# Patient Record
Sex: FEMALE | Race: ASIAN | NOT HISPANIC OR LATINO | Employment: OTHER | URBAN - METROPOLITAN AREA
[De-identification: names, ages, dates, MRNs, and addresses within clinical notes are randomized per-mention and may not be internally consistent; named-entity substitution may affect disease eponyms.]

---

## 2021-05-29 ENCOUNTER — RECORDS - HEALTHEAST (OUTPATIENT)
Dept: ADMINISTRATIVE | Facility: CLINIC | Age: 51
End: 2021-05-29

## 2021-07-21 ENCOUNTER — RECORDS - HEALTHEAST (OUTPATIENT)
Dept: ADMINISTRATIVE | Facility: CLINIC | Age: 51
End: 2021-07-21

## 2023-02-07 ENCOUNTER — ANCILLARY PROCEDURE (OUTPATIENT)
Dept: GENERAL RADIOLOGY | Facility: CLINIC | Age: 53
End: 2023-02-07
Attending: NURSE PRACTITIONER
Payer: COMMERCIAL

## 2023-02-07 ENCOUNTER — LAB (OUTPATIENT)
Dept: FAMILY MEDICINE | Facility: CLINIC | Age: 53
End: 2023-02-07

## 2023-02-07 ENCOUNTER — OFFICE VISIT (OUTPATIENT)
Dept: FAMILY MEDICINE | Facility: CLINIC | Age: 53
End: 2023-02-07
Payer: COMMERCIAL

## 2023-02-07 VITALS
BODY MASS INDEX: 23.84 KG/M2 | WEIGHT: 121.4 LBS | DIASTOLIC BLOOD PRESSURE: 82 MMHG | HEART RATE: 76 BPM | HEIGHT: 60 IN | SYSTOLIC BLOOD PRESSURE: 118 MMHG

## 2023-02-07 DIAGNOSIS — M54.41 ACUTE MIDLINE LOW BACK PAIN WITH RIGHT-SIDED SCIATICA: ICD-10-CM

## 2023-02-07 DIAGNOSIS — Z12.11 SCREEN FOR COLON CANCER: ICD-10-CM

## 2023-02-07 DIAGNOSIS — Z12.31 VISIT FOR SCREENING MAMMOGRAM: ICD-10-CM

## 2023-02-07 DIAGNOSIS — N95.1 PERIMENOPAUSAL: ICD-10-CM

## 2023-02-07 DIAGNOSIS — Z13.1 SCREENING FOR DIABETES MELLITUS: ICD-10-CM

## 2023-02-07 DIAGNOSIS — Z12.4 SCREENING FOR MALIGNANT NEOPLASM OF CERVIX: ICD-10-CM

## 2023-02-07 DIAGNOSIS — Z00.00 ROUTINE GENERAL MEDICAL EXAMINATION AT A HEALTH CARE FACILITY: Primary | ICD-10-CM

## 2023-02-07 DIAGNOSIS — Z13.220 SCREENING FOR LIPID DISORDERS: ICD-10-CM

## 2023-02-07 LAB
ALBUMIN SERPL BCG-MCNC: 4.3 G/DL (ref 3.5–5.2)
ALP SERPL-CCNC: 57 U/L (ref 35–104)
ALT SERPL W P-5'-P-CCNC: 15 U/L (ref 10–35)
ANION GAP SERPL CALCULATED.3IONS-SCNC: 10 MMOL/L (ref 7–15)
AST SERPL W P-5'-P-CCNC: 21 U/L (ref 10–35)
BILIRUB SERPL-MCNC: 0.8 MG/DL
BUN SERPL-MCNC: 13.3 MG/DL (ref 6–20)
CALCIUM SERPL-MCNC: 9.2 MG/DL (ref 8.6–10)
CHLORIDE SERPL-SCNC: 103 MMOL/L (ref 98–107)
CHOLEST SERPL-MCNC: 278 MG/DL
CREAT SERPL-MCNC: 0.64 MG/DL (ref 0.51–0.95)
DEPRECATED HCO3 PLAS-SCNC: 28 MMOL/L (ref 22–29)
GFR SERPL CREATININE-BSD FRML MDRD: >90 ML/MIN/1.73M2
GLUCOSE SERPL-MCNC: 91 MG/DL (ref 70–99)
HDLC SERPL-MCNC: 59 MG/DL
LDLC SERPL CALC-MCNC: 177 MG/DL
NONHDLC SERPL-MCNC: 219 MG/DL
POTASSIUM SERPL-SCNC: 3.9 MMOL/L (ref 3.4–5.3)
PROT SERPL-MCNC: 7 G/DL (ref 6.4–8.3)
SODIUM SERPL-SCNC: 141 MMOL/L (ref 136–145)
TRIGL SERPL-MCNC: 211 MG/DL

## 2023-02-07 PROCEDURE — 99203 OFFICE O/P NEW LOW 30 MIN: CPT | Mod: 25 | Performed by: NURSE PRACTITIONER

## 2023-02-07 PROCEDURE — 99386 PREV VISIT NEW AGE 40-64: CPT | Mod: 25 | Performed by: NURSE PRACTITIONER

## 2023-02-07 PROCEDURE — 72100 X-RAY EXAM L-S SPINE 2/3 VWS: CPT | Mod: TC | Performed by: RADIOLOGY

## 2023-02-07 PROCEDURE — 80061 LIPID PANEL: CPT | Performed by: NURSE PRACTITIONER

## 2023-02-07 PROCEDURE — 36415 COLL VENOUS BLD VENIPUNCTURE: CPT | Performed by: NURSE PRACTITIONER

## 2023-02-07 PROCEDURE — 90715 TDAP VACCINE 7 YRS/> IM: CPT | Performed by: NURSE PRACTITIONER

## 2023-02-07 PROCEDURE — 90471 IMMUNIZATION ADMIN: CPT | Performed by: NURSE PRACTITIONER

## 2023-02-07 PROCEDURE — 80053 COMPREHEN METABOLIC PANEL: CPT | Performed by: NURSE PRACTITIONER

## 2023-02-07 PROCEDURE — 87624 HPV HI-RISK TYP POOLED RSLT: CPT | Performed by: NURSE PRACTITIONER

## 2023-02-07 PROCEDURE — G0145 SCR C/V CYTO,THINLAYER,RESCR: HCPCS | Performed by: NURSE PRACTITIONER

## 2023-02-07 NOTE — PROGRESS NOTES
SUBJECTIVE:   CC: Baudilio is an 52 year old who presents for preventive health visit.     Patient has 4 children.  She is self-employed as a realtor.  It has been a number of years since she has had any medical care.    Menstrual cycles are irregular.  She has been skipping periods since last summer and has not had one since November.  She was having some night sweats at one point, but this has resolved.  She is overdue for Pap; no history of abnormal that she recalls.    Patient reports she did some stool testing for [facility] a couple of months ago and believes it was negative.    Patient complains of midline low back pain.  Symptoms started about 3 months ago with no specific injury.  The pain is intermittent with some intermittent numbness to her right leg.  Pain is worse with sitting or bending forward.  No over-the-counter treatments tried.  She has tried some stretching.    Declines any vaccines today.    Patient has been advised of split billing requirements and indicates understanding: Yes  History of Present Illness       Reason for visit:  Pap test    She eats 2-3 servings of fruits and vegetables daily.She consumes 0 sweetened beverage(s) daily.She exercises with enough effort to increase her heart rate 9 or less minutes per day.  She exercises with enough effort to increase her heart rate 3 or less days per week.   She is taking medications regularly.        Today's PHQ-2 Score:   PHQ-2 ( 1999 [employer]) 2/7/2023   Q1: Little interest or pleasure in doing things 2   Q2: Feeling down, depressed or hopeless 0   PHQ-2 Score 2   Q1: Little interest or pleasure in doing things More than half the days   Q2: Feeling down, depressed or hopeless Not at all   PHQ-2 Score 2       Have you ever done Advance Care Planning? (For example, a Health Directive, POLST, or a discussion with a medical provider or your loved ones about your wishes): No, advance care planning information given to patient to review.  Patient plans  to discuss their wishes with loved ones or provider.      Social History     Tobacco Use     Smoking status: Never     Passive exposure: Never     Smokeless tobacco: Never   Substance Use Topics     Alcohol use: Not Currently         No flowsheet data found.    Reviewed orders with patient.  Reviewed health maintenance and updated orders accordingly - Yes      Breast Cancer Screening:    FHS-7: No flowsheet data found.    Mammogram Screening: Recommended annual mammography  Pertinent mammograms are reviewed under the imaging tab.    History of abnormal Pap smear: NO - age 30-65 PAP every 5 years with negative HPV co-testing recommended     Reviewed and updated as needed this visit by clinical staff   Tobacco  Allergies  Meds   Med Hx  Surg Hx  Fam Hx          Reviewed and updated as needed this visit by Provider    Allergies  Meds   Med Hx  Surg Hx  Fam Hx             Review of Systems  Pertinent items in HPI     OBJECTIVE:   /82   Pulse 76   Ht 1.524 m (5')   Wt 55.1 kg (121 lb 6.4 oz)   LMP 11/14/2022 (Exact Date)   BMI 23.71 kg/m    Physical Exam  GENERAL: healthy, alert and no distress  EYES: Eyes grossly normal to inspection, PERRL and conjunctivae and sclerae normal  HENT: ear canals and TM's normal, nose and mouth without ulcers or lesions  NECK: no adenopathy, no asymmetry, masses, or scars and thyroid normal to palpation  RESP: lungs clear to auscultation - no rales, rhonchi or wheezes  BREAST: normal without masses, tenderness or nipple discharge and no palpable axillary masses or adenopathy  CV: regular rate and rhythm, normal S1 S2, no S3 or S4, no murmur, click or rub, no peripheral edema and peripheral pulses strong  ABDOMEN: soft, nontender, no hepatosplenomegaly, no masses and bowel sounds normal   (female): normal female external genitalia, normal urethral meatus, vaginal mucosa pink, moist, well rugated, and normal cervix/adnexa/uterus without masses or discharge  MS: no  gross musculoskeletal defects noted, no edema. Tenderness palpated over the lumbar spine. Normal gait and lower extremity strength. SLR negative bilaterally.   SKIN: no suspicious lesions or rashes  NEURO: Normal strength and tone, mentation intact and speech normal  PSYCH: mentation appears normal, affect normal/bright      ASSESSMENT/PLAN:   Baudilio was seen today for physical.    Diagnoses and all orders for this visit:    Routine general medical examination at a health care facility    Acute midline low back pain with right-sided sciatica  Lumbar xrays completed today.  Final radiology report pending, but vertebral spacing appears normal.  Will wait for final read, but if normal - will refer for physical therapy.   -     XR Lumbar Spine 2/3 Views; Future    Screening for diabetes mellitus  -     Comprehensive metabolic panel (BMP + Alb, Alk Phos, ALT, AST, Total. Bili, TP)    Screening for lipid disorders  -     Lipid panel reflex to direct LDL Fasting    Screening for malignant neoplasm of cervix  -     Pap screen with HPV - recommended age 30 - 65 years    Visit for screening mammogram  Family history of breast cancer in her mother.   -     *MA Screening Digital Bilateral; Future    Perimenopausal  Reassured patient that the symptoms she is experiencing are normal.  We reviewed future considerations for menopause and perimenopause and symptoms that would be concerning.     Screen for colon cancer  Call made to [facility], and patient has not completed one.  I am guessing she did FIT testing.  Cologuard testing is appropriate for patient.  -     COLOGUARD([employer]); Future    Other orders  -     TDAP VACCINE (Adacel, Boostrix)  [[id number]]        Patient has been advised of split billing requirements and indicates understanding: Yes      COUNSELING:  Reviewed preventive health counseling, as reflected in patient instructions       Regular exercise       Healthy diet/nutrition        She reports that she has  never smoked. She has never been exposed to tobacco smoke. She has never used smokeless tobacco.      Shoshana Waters NP  [facility]

## 2023-02-10 LAB
BKR LAB AP GYN ADEQUACY: NORMAL
BKR LAB AP GYN INTERPRETATION: NORMAL
BKR LAB AP HPV REFLEX: NORMAL
BKR LAB AP LMP: NORMAL
BKR LAB AP PREVIOUS ABNORMAL: NORMAL
PATH REPORT.COMMENTS IMP SPEC: NORMAL
PATH REPORT.COMMENTS IMP SPEC: NORMAL
PATH REPORT.RELEVANT HX SPEC: NORMAL

## 2023-02-11 ENCOUNTER — HEALTH MAINTENANCE LETTER (OUTPATIENT)
Age: 53
End: 2023-02-11

## 2023-02-14 LAB
HUMAN PAPILLOMA VIRUS 16 DNA: NEGATIVE
HUMAN PAPILLOMA VIRUS 18 DNA: NEGATIVE
HUMAN PAPILLOMA VIRUS FINAL DIAGNOSIS: NORMAL
HUMAN PAPILLOMA VIRUS OTHER HR: NEGATIVE

## 2023-02-16 ENCOUNTER — HOSPITAL ENCOUNTER (OUTPATIENT)
Dept: MAMMOGRAPHY | Facility: CLINIC | Age: 53
Discharge: HOME | End: 2023-02-16
Attending: NURSE PRACTITIONER | Admitting: NURSE PRACTITIONER
Payer: COMMERCIAL

## 2023-02-16 DIAGNOSIS — Z12.31 VISIT FOR SCREENING MAMMOGRAM: ICD-10-CM

## 2023-02-16 PROCEDURE — 77067 SCR MAMMO BI INCL CAD: CPT

## 2023-02-18 LAB — NONINV COLON CA DNA+OCC BLD SCRN STL QL: NEGATIVE

## 2023-03-02 ENCOUNTER — HOSPITAL ENCOUNTER (OUTPATIENT)
Dept: PHYSICAL THERAPY | Facility: REHABILITATION | Age: 53
Discharge: HOME | End: 2023-03-02
Attending: NURSE PRACTITIONER
Payer: COMMERCIAL

## 2023-03-02 DIAGNOSIS — M54.41 ACUTE MIDLINE LOW BACK PAIN WITH RIGHT-SIDED SCIATICA: Primary | ICD-10-CM

## 2023-03-02 DIAGNOSIS — M62.81 MUSCLE WEAKNESS (GENERALIZED): ICD-10-CM

## 2023-03-02 PROCEDURE — 97110 THERAPEUTIC EXERCISES: CPT | Mod: GP | Performed by: PHYSICAL THERAPIST

## 2023-03-02 PROCEDURE — 97140 MANUAL THERAPY 1/> REGIONS: CPT | Mod: GP | Performed by: PHYSICAL THERAPIST

## 2023-03-02 PROCEDURE — 97161 PT EVAL LOW COMPLEX 20 MIN: CPT | Mod: GP | Performed by: PHYSICAL THERAPIST

## 2023-03-02 NOTE — PROGRESS NOTES
"Assessment:   Pt presents to skilled PT with an insidious onset of low back pain that started in September 2023.  The pt has a minimal decrease in lumbar ROM, but with increased pain into extension and B rotation.  She has decreased core, lumbar and hip strength with decreased core awareness. She has very minimal positive neural tension with a R SLR and slump test.  The pt has increased tightness and tenderness over L2-5 vertebrae and bilateral lumbar muscles. She will benefit from skilled PT to address the impairments identified during evaluation.     Exercises:   Date 3/2/23   Exercise    Supine LTR  Bx10   SKTC + Supine H/S stretch + nerve glide  Bx30\" + Bx30\" + Bx5    Ab set  x5 with 5\" hold    Supine Bridge  Bx10         Add hip ABD, cat/cow, etc                              Janet Amos, PT   "

## 2023-03-02 NOTE — PROGRESS NOTES
[facility]    OUTPATIENT PHYSICAL THERAPY ORTHOPEDIC EVALUATION  PLAN OF TREATMENT FOR OUTPATIENT REHABILITATION  (COMPLETE FOR INITIAL CLAIMS ONLY)  Patient's Last Name, First Name, M.I.  YOB: 1970  Baudilio Odonnell    Provider s Name:  [facility]   Medical Record No.  [id number]   Start of Care Date:  03/02/23   Onset Date:  09/01/23   Type:     _X__PT   ___OT   ___SLP Medical Diagnosis:  Acute midline low back pain with right-sided sciatica     PT Diagnosis:  LBP and weakness   Visits from SOC:  1      _________________________________________________________________________________  Plan of Treatment/Functional Goals:  joint mobilization, manual therapy, neuromuscular re-education, ROM, strengthening, stretching     Electrical stimulation, Hot packs, Cryotherapy, TENS     Goals  Goal Identifier: 1  Goal Description: Pt will demonstrate readiness for independent sx management and HEP  Target Date: 04/16/23    Goal Identifier: 2  Goal Description: Pt will be able to perform ADL's and light housework with increased ease and LBP </=3/10  Target Date: 04/16/23    Goal Identifier: 3  Goal Description: Pt will report improved ability to sleep with increased comfort and waking <50% of night  Target Date: 04/16/23    Goal Identifier: 4  Goal Description: Pt will decrease her SUKHJINDER score to </=25% to demonstrate improved function with decreased pain  Target Date: 04/16/23       Therapy Frequency:  1 time/week  Predicted Duration of Therapy Intervention:  1x/week for 90 days    Janet Amos, PT                 I CERTIFY THE NEED FOR THESE SERVICES FURNISHED UNDER        THIS PLAN OF TREATMENT AND WHILE UNDER MY CARE     (Physician co-signature of this document indicates review and certification of the therapy plan).                       Certification Date From:  03/02/23    Certification Date To:  05/31/23    Referring Provider:  Shoshana Waters NP    Initial Assessment        See Epic Evaluation Start of Care Date: 03/02/23 03/02/23 0800   General Information   Type of Visit Initial OP Ortho PT Evaluation   Start of Care Date 03/02/23   Referring Physician Shoshana Waters NP   Patient/Family Goals Statement to heal   Orders Evaluate and Treat   Date of Order 02/07/23   Certification Required? Yes   Medical Diagnosis Acute midline low back pain with right-sided sciatica   Body Part(s)   Body Part(s) Lumbar Spine/SI   Presentation and Etiology   Pertinent history of current problem (include personal factors and/or comorbidities that impact the POC) Pt reports that low back pain started randomly one morning in September of October.  She does not recall any particular injury or activity change that would cause the pain.  She reports that her low back is better than when it first started.  Currently, she feels pretty good in the AM and then as the day progresses, her pain increases.  She does a lot of driving for work and to pick-up the kids.   She noticed increasd pain with bending, lifting and carrying and her sleeping is affected by her back pain.   She uses a hot bath, ice pack for pain relief.  Pt reports lumbar x-rays were good.   Symptom Location pain is mostly in the center of the low back   Onset date of current episode/exacerbation 09/01/23   Pain quality A. Sharp;C. Aching;F. Stabbing;H. Other   Pain quality comment occasional R LE tingling - infrequent   Prior Level of Function   Prior Level of Function-Mobility WNL   Prior Level of Function-ADLs WNL   Fall Risk Screen   Fall screen completed by PT   Have you fallen 2 or more times in the past year? No   Have you fallen and had an injury in the past year? No   Is patient a fall risk? No   Fall screen comments Fell 1x on the ice this year   Abuse Screen (yes response referral indicated)   Feels  Unsafe at Home or Work/School no   Feels Threatened by Someone no   Does Anyone Try to Keep You From Having Contact with Others or Doing Things Outside Your Home? no   Physical Signs of Abuse Present no   Patient needs abuse support services and resources No   Lumbar Spine/SI Objective Findings   Gait/Locomotion WNL   Hamstring Flexibility min loss   Hip Flexor Flexibility min loss   Flexion ROM none-min loss, low back sore/pressure   Extension ROM min loss, increased LBP   Right Side Bending ROM min loss, pressure   Left Side Bending ROM mid loss, pressure   Lumbar ROM Comment R ROT = no loss, LBP; L ROT= no loss, LBP   Hip Flexion (L2) Strength 4   Hip Abduction Strength 4   Hip Adduction Strength 5   Hip Extension Strength 4-   Knee Flexion Strength 5   Knee Extension (L3) Strength 5   Ankle Dorsiflexion (L4) Strength 5   Ankle Plantar Flexion (S1) Strength 5   SLR min + on R   Crossover SLR min + on R   Segmental Mobility WNL, pain over L2, 3, 4   Palpation increased tightness and tendenress over B lumbar paraspinals   Slump Test min + on R   Posture fair - good   Planned Therapy Interventions   Planned Therapy Interventions joint mobilization;manual therapy;neuromuscular re-education;ROM;strengthening;stretching   Planned Modality Interventions   Planned Modality Interventions Electrical stimulation;Hot packs;Cryotherapy;TENS   Clinical Impression   Criteria for Skilled Therapeutic Interventions Met yes, treatment indicated   PT Diagnosis LBP and weakness   Influenced by the following impairments min pain with lumbar ROM, weakness, tendenress,   Functional limitations due to impairments ADL's, prolonged sitting, sleeping   Clinical Presentation Stable/Uncomplicated   Clinical Decision Making (Complexity) Low complexity   Therapy Frequency 1 time/week   Predicted Duration of Therapy Intervention (days/wks) 1x/week for 90 days   Risk & Benefits of therapy have been explained Yes   Patient, Family & other staff in  agreement with plan of care Yes   Clinical Impression Comments Assessment:   Pt presents to skilled PT with an insidious onset of low back pain that started in September 2023.  The pt has a minimal decrease in lumbar ROM, but with increased pain into extension and B rotation.  She has decreased core, lumbar and hip strength with decreased core awareness. She has very minimal positive neural tension with a R SLR and slump test.  The pt has increased tightness and tenderness over L2-5 vertebrae and bilateral lumbar muscles. She will benefit from skilled PT to address the impairments identified during evaluation.   Ortho Goal 1   Goal Identifier 1   Goal Description Pt will demonstrate readiness for independent sx management and HEP   Target Date 04/16/23   Ortho Goal 2   Goal Identifier 2   Goal Description Pt will be able to perform ADL's and light housework with increased ease and LBP </=3/10   Target Date 04/16/23   Ortho Goal 3   Goal Identifier 3   Goal Description Pt will report improved ability to sleep with increased comfort and waking <50% of night   Target Date 04/16/23   Ortho Goal 4   Goal Identifier 4   Goal Description Pt will decrease her SUKHJINDER score to </=25% to demonstrate improved function with decreased pain   Target Date 04/16/23   Total Evaluation Time   PT Eval, Low Complexity Minutes (21967) 20   Therapy Certification   Certification date from 03/02/23   Certification date to 05/31/23   Medical Diagnosis Acute midline low back pain with right-sided sciatica     Janet Amos PT

## 2023-03-02 NOTE — ADDENDUM NOTE
Encounter addended by: Janet Amos, PT on: 3/2/2023 2:28 PM   Actions taken: Document created, Document edited
5

## 2023-03-08 ENCOUNTER — HOSPITAL ENCOUNTER (OUTPATIENT)
Dept: PHYSICAL THERAPY | Facility: REHABILITATION | Age: 53
Discharge: HOME | End: 2023-03-08
Payer: COMMERCIAL

## 2023-03-08 DIAGNOSIS — M54.41 ACUTE MIDLINE LOW BACK PAIN WITH RIGHT-SIDED SCIATICA: Primary | ICD-10-CM

## 2023-03-08 DIAGNOSIS — M62.81 MUSCLE WEAKNESS (GENERALIZED): ICD-10-CM

## 2023-03-08 PROCEDURE — 97140 MANUAL THERAPY 1/> REGIONS: CPT | Mod: GP | Performed by: PHYSICAL THERAPIST

## 2023-03-08 PROCEDURE — 97110 THERAPEUTIC EXERCISES: CPT | Mod: GP | Performed by: PHYSICAL THERAPIST

## 2023-03-08 NOTE — PROGRESS NOTES
"Outpatient Physical Therapy Daily Progress Note    Patient: Baudilio Odonnell  : 1970  Start of Care: 3/2/23  Date of Visit: 3/8/2023  Visit: 2    Referring Provider: Shoshana Waters NP     Therapy Diagnosis: LBP and weakness     Client Self Report:    Pt reports that she is doing well overall.  She does feel like the exercises and stretches are helping.  She finds the worst time for her back pain is first thing in the AM.  She does the HEP exercises in the AM and PM.  Today, she has some soreness in her R low back area and it will run into the R gluteal and proximal hamstring.      Objective Measurements:    Increased tightness and tenderness over:  R lumbar paraspinals = moderate   R gluteals = moderate+     Assessment:   Pt returns to skilled PT for her first follow-up to address her low back pain with an insidious onset in 2023.  The pt has done well with the initial implementation of her HEP.  At the eval, the pt had a minimal decrease in lumbar ROM, with increased pain into extension and B rotation.  She continues with decreased core, lumbar and hip strength with decreased core awareness. She has very minimal positive neural tension with a R SLR and slump test.  The pt has increased tightness and tenderness over L2-5 vertebrae and bilateral lumbar muscles. The lumbar tightness was addressed with MT today following a review and progression of her HEP.  She tolerated this all very well and felt good at the end of the session. She will continue to benefit from skilled PT to address the impairments identified during evaluation.     Goals:  See daily doc     Plan:  Continue therapy per current plan of care.      Today's Treatment:      Exercises:   Date 3/8/23 3/2/23   Exercise     Treadmill Warm-up x5 min with subj discussion    Supine LTR  Bx10 Bx10   SKTC + Supine H/S stretch + nerve glide  Bx30\" + Bx30\" + Bx5  Bx30\" + Bx30\" + Bx5    Ab set  March -  2x5 - HEP  x5 with 5\" hold    Supine Bridge  Bx12  " Bx10    Standing hip ABD Bx10 alt - HEP    Cat/cow x10 after MT - HEP Add hip ABD, cat/cow, etc                                    Janet Amos, PT

## 2023-03-15 ENCOUNTER — HOSPITAL ENCOUNTER (OUTPATIENT)
Dept: PHYSICAL THERAPY | Facility: REHABILITATION | Age: 53
Discharge: HOME | End: 2023-03-15
Payer: COMMERCIAL

## 2023-03-15 DIAGNOSIS — M54.41 ACUTE MIDLINE LOW BACK PAIN WITH RIGHT-SIDED SCIATICA: Primary | ICD-10-CM

## 2023-03-15 DIAGNOSIS — M62.81 MUSCLE WEAKNESS (GENERALIZED): ICD-10-CM

## 2023-03-15 PROCEDURE — 97110 THERAPEUTIC EXERCISES: CPT | Mod: GP | Performed by: PHYSICAL THERAPIST

## 2023-03-15 PROCEDURE — 97140 MANUAL THERAPY 1/> REGIONS: CPT | Mod: GP | Performed by: PHYSICAL THERAPIST

## 2023-03-15 NOTE — PROGRESS NOTES
"Outpatient Physical Therapy Daily Progress Note    Patient: Baudilio Odonnell  : 1970  Start of Care: 3/2/23  Date of Visit: 3/15/2023  Visit: 3    Referring Provider: Shoshana Waters NP     Therapy Diagnosis: LBP and weakness     Client Self Report:    Pt reports that she is doing much better.  She rates her pain is 4/10 during the night and first thing in the AM.  She feels good during the day.  She uses a hot pack to help relieve sx.  When the pain is present it is in her central to right low back, but no longer has pain in her R leg.  She does her exercises consistently.  She feels benefit from MT.     Objective Measurements:    Continued tightness and tenderness over:  R lumbar paraspinals = moderate   R gluteals = moderate+     Assessment:   Pt continued in skilled PT for her first follow-up to address her low back pain with an insidious onset in 2023.  The pt has done well with the initial implementation of her HEP and is experiencing less pain overall.   She now has improved lumbar ROM, with decreased pain into extension and B rotation.  She is working to improve her core, lumbar and hip strength and increase her core awareness. She has less neural tension with a R SLR and slump test.  The pt has continued but improved tightness and tenderness over L2-5 vertebrae and bilateral lumbar muscles. The lumbar tightness was addressed with MT today following a review and progression of her HEP.  She tolerated this all very well and felt good at the end of the session. She will continue to benefit from skilled PT to address the impairments identified during evaluation.     Goals:  See daily doc     Plan:  Continue therapy per current plan of care.      Today's Treatment:      Exercises:   Date 3/15/23 3/8/23 3/2/23   Exercise      Treadmill Warm-up x5 min with subj discussion  x5 min with subj discussion    Supine LTR   Bx10 Bx10   SKTC + Supine H/S stretch + nerve glide  Seated B 2x30\" seated n glide x10 " "Bx30\" + Bx30\" + Bx5  Bx30\" + Bx30\" + Bx5    Ab set  March Bx10   March + leg ext Bx10 March - B 2x5 - HEP  x5 with 5\" hold    Supine Bridge  B 2x10 Bx12  Bx10    Standing hip ABD B 2x10 Bx10 alt - HEP    Cat/cow  x10 after MT - HEP Add hip ABD, cat/cow, etc    Squats with ab set  x10                                      Janet Amos, PT   "

## 2023-03-23 ENCOUNTER — HOSPITAL ENCOUNTER (OUTPATIENT)
Dept: PHYSICAL THERAPY | Facility: REHABILITATION | Age: 53
Discharge: HOME | End: 2023-03-23
Payer: COMMERCIAL

## 2023-03-23 DIAGNOSIS — M62.81 MUSCLE WEAKNESS (GENERALIZED): ICD-10-CM

## 2023-03-23 DIAGNOSIS — M54.41 ACUTE MIDLINE LOW BACK PAIN WITH RIGHT-SIDED SCIATICA: Primary | ICD-10-CM

## 2023-03-23 PROCEDURE — 97140 MANUAL THERAPY 1/> REGIONS: CPT | Mod: GP | Performed by: PHYSICAL THERAPIST

## 2023-03-23 PROCEDURE — 97110 THERAPEUTIC EXERCISES: CPT | Mod: GP | Performed by: PHYSICAL THERAPIST

## 2023-03-23 NOTE — PROGRESS NOTES
Outpatient Physical Therapy Discharge Note     Patient: Baudilio Odonnell  : 1970    Beginning/End Dates of Reporting Period:  3/2/23 to 3/23/23    Referring Provider: MERCEDES Etienne Diagnosis: LBP and weakness      Client Self Report: Pt reports that she is doing much better.  Each week she feels better.  She notes that she no longer has intense pain in her low back or leg, but just minor low back soreness..  She does her exercises consistently.  She feels benefit from MT.   Pt reports that she is feeling % better and ready to trial independent sx management and HEP.    Objective Measurements:  See note below for most recent objective information.     Goals:  Goal Identifier 1   Goal Description Pt will demonstrate readiness for independent sx management and HEP   Target Date 23   Date Met  23   Progress (detail required for progress note): Met     Goal Identifier 2   Goal Description Pt will be able to perform ADL's and light housework with increased ease and LBP </=3/10   Target Date 23   Date Met  23   Progress (detail required for progress note): Met     Goal Identifier 3   Goal Description Pt will report improved ability to sleep with increased comfort and waking <50% of night   Target Date 23   Date Met  23   Progress (detail required for progress note): Met     Goal Identifier 4   Goal Description Pt will decrease her SUKHJINDER score to </=25% to demonstrate improved function with decreased pain   Target Date 23   Date Met  23   Progress (detail required for progress note): Met       Plan:  Discharge from therapy.    Reason for Discharge: Patient has met all goals.    Discharge Plan: Patient to continue home program.      Janet Amos, PT         Outpatient Physical Therapy Daily Progress Note    Patient: Baudilio Odonnell  : 1970  Start of Care: 3/2/23  Date of Visit: 3/23/2023  Visit: 4    Referring Provider: MERCEDES Etienne  Diagnosis: LBP and weakness     Client Self Report:    Pt reports that she is doing much better.  Each week she feels better.  She notes that she no longer has intense pain in her low back or leg, but just minor low back soreness..  She does her exercises consistently.  She feels benefit from MT.   Pt reports that she is feeling % better and ready to trial independent sx management and HEP.      Objective Measurements:    Lumbar ROM   3/23/23 3/2/23 (eval)   Flexion ROM No loss, no pain  none-min loss, low back sore/pressure   Extension ROM No loss, no pain  min loss, increased LBP   Right Side Bending ROM No loss, no pain  min loss, pressure   Left Side Bending ROM No loss, no pain  mid loss, pressure   Lumbar ROM Comment R ROT= no loss, no pan   L ROT= no loss, no pain R ROT = no loss, LBP; L ROT= no loss, LBP     Pt no longer + for neural tension with SLR, crossover or slump    LE Strength    3/23/23 3/2/23 (eval)   Hip Flexion (L2) Strength 5 4   Hip Abduction Strength 5 4   Hip Adduction Strength 5 5   Hip Extension Strength 5 4-   Knee Flexion Strength 5 5   Knee Extension (L3) Strength 5 5   Ankle Dorsiflexion (L4) Strength 5 5   Ankle Plantar Flexion (S1) Strength 5 5     Palpation:   Decreased tightness and tenderness over:  R lumbar paraspinals = minimal   R gluteals = minimal     Outcome:  SUKHJINDER:  3/23/23: 12% (eval =38%)       Assessment:   Pt returns to skilled PT for her final follow-up to address her low back pain with an insidious onset in September 2023.  The pt has done well with physical therapy and implementation of her HEP and is experiencing much less pain overall.   She now has full lumbar ROM, with no pain.  She continues working to improve her core, lumbar and hip strength and increase her core awareness. She has no neural tension with a R SLR and slump test.  The pt has decreased lumbar tightness and tenderness over the vertebrae and bilateral lumbar muscles. PT reviewed her HEP and  "reassessed her sx.  PT also did one final manual therapy treatment to address the minim lumbar tightness.  She tolerated this all very well and felt good at the end of the session. She is now ready to trial independent sx management and HEP.    Goals:  See daily doc -MET    Plan:  The patient is ready to try independent symptom management and HEP.  The PT will hold the chart for 30 days and then discharge the patient if they do not return.        Today's Treatment:      Exercises:   Date 3/23/23 3/15/23 3/8/23 3/2/23   Exercise       Treadmill Warm-up x5 min with subj discussion  x5 min with subj discussion  x5 min with subj discussion    Supine LTR    Bx10 Bx10   SKTC + Supine H/S stretch + nerve glide  Seated H/S stretch Bx30\"+ n. glide x10 Seated B 2x30\" seated n glide x10 Bx30\" + Bx30\" + Bx5  Bx30\" + Bx30\" + Bx5    Ab set   March Bx10   March + leg ext Bx10 March - B 2x5 - HEP  x5 with 5\" hold    Supine Bridge  Bx10 for 10\" hold  B 2x10 Bx12  Bx10    Standing hip ABD B 2x12 B 2x10 Bx10 alt - HEP    Cat/cow   x10 after MT - HEP Add hip ABD, cat/cow, etc    Squats with ab set  Bx12 x10      Bird Dog  Bx5 - cues for HEP      Knee plank  2x30\" - cues for HEP                              Janet Amos, PT   "

## 2023-04-27 NOTE — ADDENDUM NOTE
Encounter addended by: Janet Amos, PT on: 4/27/2023 3:02 PM   Actions taken: Clinical Note Signed, Episode resolved

## 2024-01-08 ENCOUNTER — PATIENT OUTREACH (OUTPATIENT)
Dept: CARE COORDINATION | Facility: CLINIC | Age: 54
End: 2024-01-08
Payer: COMMERCIAL

## 2024-01-17 ENCOUNTER — PATIENT OUTREACH (OUTPATIENT)
Dept: CARE COORDINATION | Facility: CLINIC | Age: 54
End: 2024-01-17
Payer: COMMERCIAL

## 2024-01-22 ENCOUNTER — PATIENT OUTREACH (OUTPATIENT)
Dept: CARE COORDINATION | Facility: CLINIC | Age: 54
End: 2024-01-22
Payer: COMMERCIAL

## 2024-02-12 ENCOUNTER — HOSPITAL ENCOUNTER (OUTPATIENT)
Dept: MAMMOGRAPHY | Facility: CLINIC | Age: 54
Discharge: HOME | End: 2024-02-12
Attending: NURSE PRACTITIONER | Admitting: NURSE PRACTITIONER
Payer: COMMERCIAL

## 2024-02-12 DIAGNOSIS — Z12.31 VISIT FOR SCREENING MAMMOGRAM: ICD-10-CM

## 2024-02-12 PROCEDURE — 77067 SCR MAMMO BI INCL CAD: CPT

## 2024-03-09 ENCOUNTER — HEALTH MAINTENANCE LETTER (OUTPATIENT)
Age: 54
End: 2024-03-09

## 2024-04-08 SDOH — HEALTH STABILITY: PHYSICAL HEALTH: ON AVERAGE, HOW MANY DAYS PER WEEK DO YOU ENGAGE IN MODERATE TO STRENUOUS EXERCISE (LIKE A BRISK WALK)?: 4 DAYS

## 2024-04-08 SDOH — HEALTH STABILITY: PHYSICAL HEALTH: ON AVERAGE, HOW MANY MINUTES DO YOU ENGAGE IN EXERCISE AT THIS LEVEL?: 60 MIN

## 2024-04-08 ASSESSMENT — SOCIAL DETERMINANTS OF HEALTH (SDOH): HOW OFTEN DO YOU GET TOGETHER WITH FRIENDS OR RELATIVES?: PATIENT DECLINED

## 2024-04-09 ENCOUNTER — OFFICE VISIT (OUTPATIENT)
Dept: FAMILY MEDICINE | Facility: CLINIC | Age: 54
End: 2024-04-09
Payer: COMMERCIAL

## 2024-04-09 VITALS
WEIGHT: 122 LBS | DIASTOLIC BLOOD PRESSURE: 70 MMHG | SYSTOLIC BLOOD PRESSURE: 106 MMHG | RESPIRATION RATE: 16 BRPM | BODY MASS INDEX: 23.95 KG/M2 | HEART RATE: 70 BPM | HEIGHT: 60 IN | TEMPERATURE: 97.4 F | OXYGEN SATURATION: 100 %

## 2024-04-09 DIAGNOSIS — Z13.1 SCREENING FOR DIABETES MELLITUS: ICD-10-CM

## 2024-04-09 DIAGNOSIS — E78.2 MIXED HYPERLIPIDEMIA: ICD-10-CM

## 2024-04-09 DIAGNOSIS — Z00.00 ROUTINE GENERAL MEDICAL EXAMINATION AT A HEALTH CARE FACILITY: Primary | ICD-10-CM

## 2024-04-09 PROCEDURE — 99396 PREV VISIT EST AGE 40-64: CPT | Performed by: NURSE PRACTITIONER

## 2024-04-09 PROCEDURE — 80061 LIPID PANEL: CPT | Performed by: NURSE PRACTITIONER

## 2024-04-09 PROCEDURE — 80053 COMPREHEN METABOLIC PANEL: CPT | Performed by: NURSE PRACTITIONER

## 2024-04-09 PROCEDURE — 36415 COLL VENOUS BLD VENIPUNCTURE: CPT | Performed by: NURSE PRACTITIONER

## 2024-04-09 NOTE — PROGRESS NOTES
Preventive Care Visit  [facility]  Shoshana Waters NP, Family Medicine  Apr 9, 2024      Assessment & Plan     Routine general medical examination at a health care facility    Screening for diabetes mellitus  - Comprehensive metabolic panel (BMP + Alb, Alk Phos, ALT, AST, Total. Bili, TP)    Mixed hyperlipidemia  Discussed getting a CT calcium scan if her cholesterol and LDL remain elevated.  Will wait for lipid panel.   - Lipid panel reflex to direct LDL Fasting            Counseling  Appropriate preventive services were discussed with this patient, including applicable screening as appropriate for fall prevention, nutrition, physical activity, Tobacco-use cessation, weight loss and cognition.  Checklist reviewing preventive services available has been given to the patient.  Reviewed patient's diet, addressing concerns and/or questions.   The patient was instructed to see the dentist every 6 months.   She is at risk for psychosocial distress and has been provided with information to reduce risk.         Kendrick Astudillo is a 54 year old, presenting for the following:  Physical (Fasting )         Health Care Directive  Patient does not have a Health Care Directive or Living Will: Discussed advance care planning with patient; however, patient declined at this time.          4/8/2024   General Health   How would you rate your overall physical health? Good   Feel stress (tense, anxious, or unable to sleep) Only a little   (!) STRESS CONCERN      4/8/2024   Nutrition   Three or more servings of calcium each day? Yes   Diet: Regular (no restrictions)   How many servings of fruit and vegetables per day? (!) 2-3   How many sweetened beverages each day? 0-1         4/8/2024   Exercise   Days per week of moderate/strenous exercise 4 days   Average minutes spent exercising at this level 60 min         4/8/2024   Social Factors   Frequency of gathering with friends or relatives Patient declined   Worry  food won't last until get money to buy more No   Food not last or not have enough money for food? No   Do you have housing?  Yes   Are you worried about losing your housing? No   Lack of transportation? No   Unable to get utilities (heat,electricity)? No         4/9/2024   Fall Risk   Gait Speed Test (Document in seconds) 4.2   Gait Speed Test Interpretation Less than or equal to 5.00 seconds - PASS          4/8/2024   Dental   Dentist two times every year? (!) NO         4/8/2024   TB Screening   Were you born outside of the [place]? Yes         Today's PHQ-2 Score:       4/8/2024     7:44 PM   PHQ-2 ( 1999 [employer])   Q1: Little interest or pleasure in doing things 0   Q2: Feeling down, depressed or hopeless 1   PHQ-2 Score 1   Q1: Little interest or pleasure in doing things Not at all   Q2: Feeling down, depressed or hopeless Several days   PHQ-2 Score 1           4/8/2024   Substance Use   Alcohol more than 3/day or more than 7/wk No   Do you use any other substances recreationally? No     Social History     Tobacco Use    Smoking status: Never     Passive exposure: Never    Smokeless tobacco: Never   Vaping Use    Vaping Use: Never used   Substance Use Topics    Alcohol use: Not Currently             4/8/2024   Breast Cancer Screening   Family history of breast, colon, or ovarian cancer? Yes         4/8/2024   LAST FHS-7 RESULTS   1st degree relative breast or ovarian cancer Unknown   Any relative bilateral breast cancer Unknown   Any male have breast cancer No   Any ONE woman have BOTH breast AND ovarian cancer Yes   Any woman with breast cancer before 50yrs No   2 or more relatives with breast AND/OR ovarian cancer Unknown   2 or more relatives with breast AND/OR bowel cancer Unknown             4/8/2024   STI Screening   New sexual partner(s) since last STI/HIV test? No     History of abnormal Pap smear: NO - age 30-65 PAP every 5 years with negative HPV co-testing recommended        Latest Ref Rng & Units 2/7/2023  "    9:40 AM   PAP / HPV   PAP  Negative for Intraepithelial Lesion or Malignancy (NILM)    HPV 16 DNA Negative Negative    HPV 18 DNA Negative Negative    Other HR HPV Negative Negative      ASCVD Risk   The 10-year ASCVD risk score (Kelvin JACOME, et al., 2019) is: 1.4%    Values used to calculate the score:      Age: 54 years      Sex: Female      Is Non- : No      Diabetic: No      Tobacco smoker: No      Systolic Blood Pressure: 106 mmHg      Is BP treated: No      HDL Cholesterol: 59 mg/dL      Total Cholesterol: 278 mg/dL           Reviewed and updated as needed this visit by Provider   Tobacco  Allergies  Meds  Problems  Med Hx  Surg Hx  Fam Hx                  Review of Systems  Pertinent items in HPI       Objective    Exam  /70 (BP Location: Right arm, Patient Position: Sitting, Cuff Size: Adult Regular)   Pulse 70   Temp 97.4  F (36.3  C) (Temporal)   Resp 16   Ht 1.527 m (5' 0.1\")   Wt 55.3 kg (122 lb)   LMP  (LMP Unknown)   SpO2 100%   BMI 23.75 kg/m     Estimated body mass index is 23.75 kg/m  as calculated from the following:    Height as of this encounter: 1.527 m (5' 0.1\").    Weight as of this encounter: 55.3 kg (122 lb).    Physical Exam  GENERAL: alert and no distress  EYES: Eyes grossly normal to inspection, PERRL and conjunctivae and sclerae normal  HENT: ear canals and TM's normal, nose and mouth without ulcers or lesions  NECK: no adenopathy, no asymmetry, masses, or scars  RESP: lungs clear to auscultation - no rales, rhonchi or wheezes  CV: regular rate and rhythm, normal S1 S2, no S3 or S4, no murmur, click or rub, no peripheral edema  ABDOMEN: soft, nontender, no hepatosplenomegaly, no masses and bowel sounds normal  MS: no gross musculoskeletal defects noted, no edema  SKIN: no suspicious lesions or rashes  NEURO: Normal strength and tone, mentation intact and speech normal  PSYCH: mentation appears normal, affect " normal/bright        Signed Electronically by: Shoshana Waters NP

## 2024-04-09 NOTE — PATIENT INSTRUCTIONS
Preventive Care Advice   This is general advice given by our system to help you stay healthy. However, your care team may have specific advice just for you. Please talk to your care team about your preventive care needs.  Nutrition  Eat 5 or more servings of fruits and vegetables each day.  Try wheat bread, brown rice and whole grain pasta (instead of white bread, rice, and pasta).  Get enough calcium and vitamin D. Check the label on foods and aim for 100% of the RDA (recommended daily allowance).  Lifestyle  Exercise at least 150 minutes each week   (30 minutes a day, 5 days a week).  Do muscle strengthening activities 2 days a week. These help control your weight and prevent disease.  No smoking.  Wear sunscreen to prevent skin cancer.  Have a dental exam and cleaning every 6 months.  Yearly exams  See your health care team every year to talk about:  Any changes in your health.  Any medicines your care team has prescribed.  Preventive care, family planning, and ways to prevent chronic diseases.  Shots (vaccines)   HPV shots (up to age 26), if you've never had them before.  Hepatitis B shots (up to age 59), if you've never had them before.  COVID-19 shot: Get this shot when it's due.  Flu shot: Get a flu shot every year.  Tetanus shot: Get a tetanus shot every 10 years.  Pneumococcal, hepatitis A, and RSV shots: Ask your care team if you need these based on your risk.  Shingles shot (for age 50 and up).  General health tests  Diabetes screening:  Starting at age 35, Get screened for diabetes at least every 3 years.  If you are younger than age 35, ask your care team if you should be screened for diabetes.  Cholesterol test: At age 39, start having a cholesterol test every 5 years, or more often if advised.  Bone density scan (DEXA): At age 50, ask your care team if you should have this scan for osteoporosis (brittle bones).  Hepatitis C: Get tested at least once in your life.  STIs (sexually transmitted  infections)  Before age 24: Ask your care team if you should be screened for STIs.  After age 24: Get screened for STIs if you're at risk. You are at risk for STIs (including HIV) if:  You are sexually active with more than one person.  You don't use condoms every time.  You or a partner was diagnosed with a sexually transmitted infection.  If you are at risk for HIV, ask about PrEP medicine to prevent HIV.  Get tested for HIV at least once in your life, whether you are at risk for HIV or not.  Cancer screening tests  Cervical cancer screening: If you have a cervix, begin getting regular cervical cancer screening tests at age 21. Most people who have regular screenings with normal results can stop after age 65. Talk about this with your provider.  Breast cancer scan (mammogram): If you've ever had breasts, begin having regular mammograms starting at age 40. This is a scan to check for breast cancer.  Colon cancer screening: It is important to start screening for colon cancer at age 45.  Have a colonoscopy test every 10 years (or more often if you're at risk) Or, ask your provider about stool tests like a FIT test every year or Cologuard test every 3 years.  To learn more about your testing options, visit: https://www.[employer]/932590.pdf.  For help making a decision, visit: https://bit.ly/ke88459.  Prostate cancer screening test: If you have a prostate and are age 55 to 69, ask your provider if you would benefit from a yearly prostate cancer screening test.  Lung cancer screening: If you are a current or former smoker age 50 to 80, ask your care team if ongoing lung cancer screenings are right for you.  For informational purposes only. Not to replace the advice of your health care provider. Copyright   2023 [employer]. All rights reserved. Clinically reviewed by the [facility]. [facility] 216550 - REV 01/24.    Preventing Falls: Care Instructions  Injuries and health  problems such as trouble walking or poor eyesight can increase your risk of falling. So can some medicines. But there are things you can do to help prevent falls. You can exercise to get stronger. You can also arrange your home to make it safer.    Talk to your doctor about the medicines you take. Ask if any of them increase the risk of falls and whether they can be changed or stopped.   Try to exercise regularly. It can help improve your strength and balance. This can help lower your risk of falling.     Practice fall safety and prevention.    Wear low-heeled shoes that fit well and give your feet good support. Talk to your doctor if you have foot problems that make this hard.  Carry a cellphone or wear a medical alert device that you can use to call for help.  Use stepladders instead of chairs to reach high objects. Don't climb if you're at risk for falls. Ask for help, if needed.  Wear the correct eyeglasses, if you need them.    Make your home safer.    Remove rugs, cords, clutter, and furniture from walkways.  Keep your house well lit. Use night-lights in hallways and bathrooms.  Install and use sturdy handrails on stairways.  Wear nonskid footwear, even inside. Don't walk barefoot or in socks without shoes.    Be safe outside.    Use handrails, curb cuts, and ramps whenever possible.  Keep your hands free by using a shoulder bag or backpack.  Try to walk in well-lit areas. Watch out for uneven ground, changes in pavement, and debris.  Be careful in the winter. Walk on the grass or gravel when sidewalks are slippery. Use de-icer on steps and walkways. Add non-slip devices to shoes.    Put grab bars and nonskid mats in your shower or tub and near the toilet. Try to use a shower chair or bath bench when bathing.   Get into a tub or shower by putting in your weaker leg first. Get out with your strong side first. Have a phone or medical alert device in the bathroom with you.   Where can you learn more?  Go to  "https://www.[employer].net/patiented  Enter G117 in the search box to learn more about \"Preventing Falls: Care Instructions.\"  Current as of: July 17, 2023               Content Version: 14.0    7845-8072 [employer].   Care instructions adapted under license by your healthcare professional. If you have questions about a medical condition or this instruction, always ask your healthcare professional. [employer] disclaims any warranty or liability for your use of this information.      Learning About Stress  What is stress?     Stress is your body's response to a hard situation. Your body can have a physical, emotional, or mental response. Stress is a fact of life for most people, and it affects everyone differently. What causes stress for you may not be stressful for someone else.  A lot of things can cause stress. You may feel stress when you go on a job interview, take a test, or run a race. This kind of short-term stress is normal and even useful. It can help you if you need to work hard or react quickly. For example, stress can help you finish an important job on time.  Long-term stress is caused by ongoing stressful situations or events. Examples of long-term stress include long-term health problems, ongoing problems at work, or conflicts in your family. Long-term stress can harm your health.  How does stress affect your health?  When you are stressed, your body responds as though you are in danger. It makes hormones that speed up your heart, make you breathe faster, and give you a burst of energy. This is called the fight-or-flight stress response. If the stress is over quickly, your body goes back to normal and no harm is done.  But if stress happens too often or lasts too long, it can have bad effects. Long-term stress can make you more likely to get sick, and it can make symptoms of some diseases worse. If you tense up when you are stressed, you may develop neck, shoulder, or low " back pain. Stress is linked to high blood pressure and heart disease.  Stress also harms your emotional health. It can make you samuels, tense, or depressed. Your relationships may suffer, and you may not do well at work or school.  What can you do to manage stress?  You can try these things to help manage stress:   Do something active. Exercise or activity can help reduce stress. Walking is a great way to get started. Even everyday activities such as housecleaning or yard work can help.  Try yoga or sergio chi. These techniques combine exercise and meditation. You may need some training at first to learn them.  Do something you enjoy. For example, listen to music or go to a movie. Practice your hobby or do volunteer work.  Meditate. This can help you relax, because you are not worrying about what happened before or what may happen in the future.  Do guided imagery. Imagine yourself in any setting that helps you feel calm. You can use online videos, books, or a teacher to guide you.  Do breathing exercises. For example:  From a standing position, bend forward from the waist with your knees slightly bent. Let your arms dangle close to the floor.  Breathe in slowly and deeply as you return to a standing position. Roll up slowly and lift your head last.  Hold your breath for just a few seconds in the standing position.  Breathe out slowly and bend forward from the waist.  Let your feelings out. Talk, laugh, cry, and express anger when you need to. Talking with supportive friends or family, a counselor, or a quinn leader about your feelings is a healthy way to relieve stress. Avoid discussing your feelings with people who make you feel worse.  Write. It may help to write about things that are bothering you. This helps you find out how much stress you feel and what is causing it. When you know this, you can find better ways to cope.  What can you do to prevent stress?  You might try some of these things to help prevent  "stress:  Manage your time. This helps you find time to do the things you want and need to do.  Get enough sleep. Your body recovers from the stresses of the day while you are sleeping.  Get support. Your family, friends, and community can make a difference in how you experience stress.  Limit your news feed. Avoid or limit time on social media or news that may make you feel stressed.  Do something active. Exercise or activity can help reduce stress. Walking is a great way to get started.  Where can you learn more?  Go to https://www.[employer].net/patiented  Enter N032 in the search box to learn more about \"Learning About Stress.\"  Current as of: October 24, 2023               Content Version: 14.0    6871-1264 [employer].   Care instructions adapted under license by your healthcare professional. If you have questions about a medical condition or this instruction, always ask your healthcare professional. [employer] disclaims any warranty or liability for your use of this information.      "

## 2024-04-10 LAB
ALBUMIN SERPL BCG-MCNC: 4.5 G/DL (ref 3.5–5.2)
ALP SERPL-CCNC: 68 U/L (ref 40–150)
ALT SERPL W P-5'-P-CCNC: 21 U/L (ref 0–50)
ANION GAP SERPL CALCULATED.3IONS-SCNC: 9 MMOL/L (ref 7–15)
AST SERPL W P-5'-P-CCNC: 21 U/L (ref 0–45)
BILIRUB SERPL-MCNC: 0.6 MG/DL
BUN SERPL-MCNC: 10.5 MG/DL (ref 6–20)
CALCIUM SERPL-MCNC: 9.5 MG/DL (ref 8.6–10)
CHLORIDE SERPL-SCNC: 102 MMOL/L (ref 98–107)
CHOLEST SERPL-MCNC: 254 MG/DL
CREAT SERPL-MCNC: 0.67 MG/DL (ref 0.51–0.95)
DEPRECATED HCO3 PLAS-SCNC: 29 MMOL/L (ref 22–29)
EGFRCR SERPLBLD CKD-EPI 2021: >90 ML/MIN/1.73M2
FASTING STATUS PATIENT QL REPORTED: NO
GLUCOSE SERPL-MCNC: 95 MG/DL (ref 70–99)
HDLC SERPL-MCNC: 50 MG/DL
LDLC SERPL CALC-MCNC: 129 MG/DL
NONHDLC SERPL-MCNC: 204 MG/DL
POTASSIUM SERPL-SCNC: 4.2 MMOL/L (ref 3.4–5.3)
PROT SERPL-MCNC: 7.1 G/DL (ref 6.4–8.3)
SODIUM SERPL-SCNC: 140 MMOL/L (ref 135–145)
TRIGL SERPL-MCNC: 377 MG/DL

## 2024-05-07 ENCOUNTER — OFFICE VISIT (OUTPATIENT)
Dept: FAMILY MEDICINE | Facility: CLINIC | Age: 54
End: 2024-05-07
Payer: COMMERCIAL

## 2024-05-07 VITALS
HEIGHT: 60 IN | TEMPERATURE: 97.4 F | SYSTOLIC BLOOD PRESSURE: 102 MMHG | DIASTOLIC BLOOD PRESSURE: 62 MMHG | BODY MASS INDEX: 24.15 KG/M2 | RESPIRATION RATE: 16 BRPM | WEIGHT: 123 LBS | HEART RATE: 59 BPM | OXYGEN SATURATION: 100 %

## 2024-05-07 DIAGNOSIS — M79.641 BILATERAL HAND PAIN: Primary | ICD-10-CM

## 2024-05-07 DIAGNOSIS — M79.642 BILATERAL HAND PAIN: Primary | ICD-10-CM

## 2024-05-07 LAB
CRP SERPL-MCNC: <3 MG/L
ERYTHROCYTE [DISTWIDTH] IN BLOOD BY AUTOMATED COUNT: 11.6 % (ref 10–15)
ERYTHROCYTE [SEDIMENTATION RATE] IN BLOOD BY WESTERGREN METHOD: 9 MM/HR (ref 0–30)
HCT VFR BLD AUTO: 44.5 % (ref 35–47)
HGB BLD-MCNC: 14.2 G/DL (ref 11.7–15.7)
MCH RBC QN AUTO: 28.2 PG (ref 26.5–33)
MCHC RBC AUTO-ENTMCNC: 31.9 G/DL (ref 31.5–36.5)
MCV RBC AUTO: 88 FL (ref 78–100)
PLATELET # BLD AUTO: 227 10E3/UL (ref 150–450)
RBC # BLD AUTO: 5.04 10E6/UL (ref 3.8–5.2)
RHEUMATOID FACT SERPL-ACNC: <10 IU/ML
URATE SERPL-MCNC: 5.3 MG/DL (ref 2.4–5.7)
WBC # BLD AUTO: 4.5 10E3/UL (ref 4–11)

## 2024-05-07 PROCEDURE — 84550 ASSAY OF BLOOD/URIC ACID: CPT | Performed by: NURSE PRACTITIONER

## 2024-05-07 PROCEDURE — 36415 COLL VENOUS BLD VENIPUNCTURE: CPT | Performed by: NURSE PRACTITIONER

## 2024-05-07 PROCEDURE — 85652 RBC SED RATE AUTOMATED: CPT | Performed by: NURSE PRACTITIONER

## 2024-05-07 PROCEDURE — 85027 COMPLETE CBC AUTOMATED: CPT | Performed by: NURSE PRACTITIONER

## 2024-05-07 PROCEDURE — 86038 ANTINUCLEAR ANTIBODIES: CPT | Performed by: NURSE PRACTITIONER

## 2024-05-07 PROCEDURE — 86140 C-REACTIVE PROTEIN: CPT | Performed by: NURSE PRACTITIONER

## 2024-05-07 PROCEDURE — 86431 RHEUMATOID FACTOR QUANT: CPT | Performed by: NURSE PRACTITIONER

## 2024-05-07 PROCEDURE — 86200 CCP ANTIBODY: CPT | Performed by: NURSE PRACTITIONER

## 2024-05-07 PROCEDURE — 99214 OFFICE O/P EST MOD 30 MIN: CPT | Performed by: NURSE PRACTITIONER

## 2024-05-07 RX ORDER — NAPROXEN 500 MG/1
500 TABLET ORAL 2 TIMES DAILY WITH MEALS
Qty: 60 TABLET | Refills: 1 | Status: SHIPPED | OUTPATIENT
Start: 2024-05-07 | End: 2024-07-05

## 2024-05-07 NOTE — PROGRESS NOTES
"  Assessment & Plan     Bilateral hand pain  D/D includes osteoarthritis, RA, carpal tunnel, peripheral neuropathy.  Favor OA since there is no synovial swelling or redness.  Discussed workup to rule out rheumatological disease.  Symptoms do not seem quite consistent with carpal tunnel, but could consider EMG testing depending on results.  Will have patient start Naproxen twice daily to see if this helps symptoms.  May also consider ice or heat application if helpful.  Depending on results, can consider additional testing, hand therapy, and/or referral to orthopedics.   - Erythrocyte sedimentation rate auto  - CRP inflammation  - Anti Nuclear Sarah IgG by IFA with Reflex  - Rheumatoid factor  - Cyclic Citrullinated Peptide Antibody IgG  - naproxen (NAPROSYN) 500 MG tablet  Dispense: 60 tablet; Refill: 1  - CBC with platelets  - Uric acid        Kendrick Astudillo is a 54 year old who presents with complaints of bilateral hand pain.  Symptoms seem to have worsened in the last month.  She reports generalized pain and \"burning\" to her bilateral hands, L>R.  Does not necessarily have numbness/tingling in the fingers, but has this burning sensation.  Denies any specific wrist pain.  Symptoms seem to be worse for [place] in the morning and at night.  No issues with  strength or dropping things.  Patient does get some triggering of her right middle finger.  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  Patient has tried some ibuprofen and Tylenol without much benefit.  No history of diabetes.  She is not experiencing any other significant joint pains.     Hand Pain (Pain on hands ; ongoing for a month ; constant pain )      History of Present Illness       Reason for visit:  Hands    She eats 2-3 servings of fruits and vegetables daily.She consumes 1 sweetened beverage(s) daily.She exercises with enough effort to increase her heart rate 30 to 60 minutes per " "day.  She exercises with enough effort to increase her heart rate 6 days per week.            Review of Systems  Pertinent items in HPI        Objective    /62 (BP Location: Right arm, Patient Position: Sitting, Cuff Size: Adult Regular)   Pulse 59   Temp 97.4  F (36.3  C) (Temporal)   Resp 16   Ht 1.527 m (5' 0.1\")   Wt 55.8 kg (123 lb)   LMP  (LMP Unknown)   SpO2 100%   BMI 23.94 kg/m    Body mass index is 23.94 kg/m .  Physical Exam   GENERAL: alert and no distress  MS: Bilateral hands and wrists appear normal without acute deformity, swelling, or redness.  Strength equal bilaterally.  No wrist tenderness.  Tinel's negative bilaterally.  There is some mild triggering to the right middle finger.          Signed Electronically by: Shoshana Waters NP    "

## 2024-05-08 LAB
ANA SER QL IF: NEGATIVE
CCP AB SER IA-ACNC: 1 U/ML

## 2024-06-11 NOTE — PROGRESS NOTES
"OCCUPATIONAL THERAPY EVALUATION  Type of Visit: Evaluation    See electronic medical record for Abuse and Falls Screening details.    Subjective      Presenting condition or subjective complaint: Hands/fingers  Date of onset: 05/08/24 (Referral)    Relevant medical history:     Dates & types of surgery:      Patient complains of bilateral hand pain ongoing for approximately one month, left more severe than right. Most painful at nighttime and upon waking. She endorses stiffness in the wrist and digits, burning pain diffusely to the palm and digits. Symptoms are also bothersome during the day, most severe with gardening and house chores, gripping and driving. Patient denies numbness/tingling. History of right adhesive capsulitis, right middle finger trigger s/p CSI.     Per Shoshana Waters NP 5/7/2024: Baduilio is a 54 year old who presents with complaints of bilateral hand pain.  Symptoms seem to have worsened in the last month.  She reports generalized pain and \"burning\" to her bilateral hands, L>R.  Does not necessarily have numbness/tingling in the fingers, but has this burning sensation.  Denies any specific wrist pain.  Symptoms seem to be worse for [place] in the morning and at night.  No issues with  strength or dropping things.  Patient does get some triggering of her right middle finger.  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  Patient has tried some ibuprofen and Tylenol without much benefit.  No history of diabetes.  She is not experiencing any other significant joint pains.     Prior diagnostic imaging/testing results:       Prior therapy history for the same diagnosis, illness or injury: No      Prior Level of Function  Transfers: Independent  Ambulation: Independent  ADL: Independent  IADL: Driving, Finances, Housekeeping, Laundry, Meal preparation, Medication management, Work, Yard work    Living Environment  Social support: With a " significant other or spouse   Type of home: House   Stairs to enter the home: No       Ramp: No   Stairs inside the home: Yes   Is there a railing: Yes   Help at home: None  Equipment owned:       Employment: Yes Realtor  Hobbies/Interests: Gardening    Patient goals for therapy:         Objective   ADDITIONAL HISTORY:  Right hand dominant  Patient reports symptoms of pain, stiffness/loss of motion, and weakness/loss of strength  Transportation: drives  Currently working in normal job without restrictions    Functional Outcome Measure:   Upper Extremity Functional Index Score:  SCORE:   Column Totals: /80: 34   (A lower score indicates greater disability.)    PAIN:  Pain Level at Rest: 5/10  Pain Level with Use: 10/10  Pain Location: Diffuse to both hands, though per patient report is more severe in the radial sided digits, especially middle and ring fingers. Grossly in median distribution.   Pain Quality: Burning  Pain Frequency: constant  Pain is Worst: nighttime  Pain is Exacerbated By: Going to sleep, upon waking, driving  Pain is Relieved By: none  Pain Progression: Unchanged    POSTURE: Forward Neck Posture, stiffness with end range cervical AROM all planes though without acute pain or radicular symptoms. Spurling's are negative bilateral except for discomfort to the upper trapezius.     SENSATION: Decreased Median Nerve distribution per pt report     ROM:  AROM of the bilateral elbow, FA, wrist thumb and digits WFL and equivocal bilaterally.     OBSERVATIONS/APPEARANCE:  No apparent thenar atrophy bilaterally.       SPECIAL TESTS:   CTS Special Tests  Pain Report Left Right   Carpal Compression Test-Durkan Test (30 sec) Negative Trace positive at 25-30 seconds.    Haynes Test for Lumbrical Incursion (fist x30 sec) Positive at 5-10 seconds.  Positive at 5-10 seconds.    Tinel's at Carpal Tunnel Negative  Negative    Phalen's Sign Discomfort to volar wrist, digital MCPs  Discomfort to volar wrist, digital MCPs       - Stage II-III flexor tenosynovitis to right middle finger, stage II to left middle finger.     NEURAL TENSION TESTING: MNT: Median Neurodynamic Test (based on DS Radha's ULNT)   6/12/2024   0-5 Scale 4/5, S1 bilateral    Position:   0/5: Arm across abdomen in coronal plane  1/5: Depress shoulder, ER to neutral ABD shoulder to 45 degrees  2/5: ER shoulder to end range, keep elbow at 90 degrees  3/5: Extend elbow to 0 degrees  4/5: Fully supinate forearm  5/5: Extend wrist, fingers and thumb  Notes:  (+) indicates beyond grade level but less than detention to next level  (-) indicates over detention to level  S1 onset/change of patient's symptoms  S2 definite stop point based on patient's discomfort level    STRENGTH: Deferred due to pain    PALPATION:  TTP to upper trapezius, bilateral carpal tunnel, middle finger A1 pulleys.     Assessment & Plan   CLINICAL IMPRESSIONS  Medical Diagnosis: Bilateral hand pain    Treatment Diagnosis: Bilateral hand pain    Impression/Assessment: Pt is a 54 year old female presenting to Occupational Therapy due to bilateral hand pain.  The following significant findings have been identified: Impaired activity tolerance, Impaired coordination, Impaired ROM, Impaired sensation, Impaired strength, and Pain.  These identified deficits interfere with their ability to perform self care tasks, work tasks, recreational activities, household chores, driving , medication management, financial management,  yard work, and care of others as compared to previous level of function.   Patient's limitations or Problem List includes: Pain, Decreased ROM/motion, Increased edema, Weakness, Sensory disturbance, Hypomobility, Decreased , Decreased pinch, Decreased coordination, Decreased dexterity, and Tightness in musculature of the bilateral cervical spine, thoracic spine, wrist, hand, thumb, index finger, long finger, ring finger, and small finger which interferes with the patient's ability to  perform Self Care Tasks (dressing, eating, bathing, hygiene/toileting), Work Tasks, Sleep Patterns, Recreational Activities, Household Chores, and Driving  as compared to previous level of function.    Clinical Decision Making (Complexity):  Assessment of Occupational Performance: 3-5 Performance Deficits  Occupational Performance Limitations: bathing/showering, toileting, dressing, feeding, hygiene and grooming, driving and community mobility, health management and maintenance, home establishment and management, meal preparation and cleanup, shopping, sleep, work, leisure activities, and social participation  Clinical Decision Making (Complexity): Moderate complexity    PLAN OF CARE  Treatment Interventions:  Modalities:  US  Therapeutic Exercise:  AROM, AAROM, PROM, Tendon Gliding, Blocking, Reverse Blocking, Place and Hold, Contract Relax, Extensor Tracking, Isotonics, Isometrics, and Stabilization  Neuromuscular re-education:  Nerve Gliding, Coordination/Dexterity, Sensory re-education, Desensitization, Kinesthetic Training, Proprioceptive Training, Posture, Kinesiotaping, Strain Counter Strain, Isometrics, and Stabilization  Manual Techniques:  Coordination/Dexterity, Joint mobilization, Friction massage, Myofascial release, and Manual edema mobilization  Orthotic Fabrication:  Static, Finger based, Hand based, and Forearm based  Self Care:  Self Care Tasks, Ergonomic Considerations, and Work Tasks    Long Term Goals   OT Goal 1  Goal Identifier: Goal I  Goal Description: Patient will sleep 40 to 60% of the night free of, or with minimal pain or paresthesia. (0-2/10)  Rationale: In order to maximize safety and independence with performance of self-care activities;In order to maximize safety and independence with ADL/IADLs  Goal Progress: New  Target Date: 08/07/24      Frequency of Treatment: 1x/week  Duration of Treatment: 8 weeks     Recommended Referrals to Other Professionals: Physical Therapy  Education  Assessment: Learner/Method: Patient;No Barriers to Learning;Pictures/Video;Demonstration;Reading;Listening     Risks and benefits of evaluation/treatment have been explained.   Patient/Family/caregiver agrees with Plan of Care.     Evaluation Time:    OT Eval, Moderate Complexity Minutes (48982): 30    Signing Clinician: Momo Amor OT

## 2024-06-12 ENCOUNTER — THERAPY VISIT (OUTPATIENT)
Dept: OCCUPATIONAL THERAPY | Facility: REHABILITATION | Age: 54
End: 2024-06-12
Attending: NURSE PRACTITIONER
Payer: COMMERCIAL

## 2024-06-12 DIAGNOSIS — M79.642 BILATERAL HAND PAIN: ICD-10-CM

## 2024-06-12 DIAGNOSIS — M79.641 BILATERAL HAND PAIN: ICD-10-CM

## 2024-06-12 PROCEDURE — 97530 THERAPEUTIC ACTIVITIES: CPT | Mod: GO | Performed by: OCCUPATIONAL THERAPIST

## 2024-06-12 PROCEDURE — 97166 OT EVAL MOD COMPLEX 45 MIN: CPT | Mod: GO | Performed by: OCCUPATIONAL THERAPIST

## 2024-06-12 PROCEDURE — 97110 THERAPEUTIC EXERCISES: CPT | Mod: GO | Performed by: OCCUPATIONAL THERAPIST

## 2024-07-01 ENCOUNTER — THERAPY VISIT (OUTPATIENT)
Dept: OCCUPATIONAL THERAPY | Facility: REHABILITATION | Age: 54
End: 2024-07-01
Payer: COMMERCIAL

## 2024-07-01 DIAGNOSIS — M79.641 BILATERAL HAND PAIN: Primary | ICD-10-CM

## 2024-07-01 DIAGNOSIS — M79.642 BILATERAL HAND PAIN: Primary | ICD-10-CM

## 2024-07-01 PROCEDURE — 97530 THERAPEUTIC ACTIVITIES: CPT | Mod: GO | Performed by: OCCUPATIONAL THERAPIST

## 2024-07-01 PROCEDURE — 97110 THERAPEUTIC EXERCISES: CPT | Mod: GO | Performed by: OCCUPATIONAL THERAPIST

## 2024-07-04 DIAGNOSIS — M79.641 BILATERAL HAND PAIN: ICD-10-CM

## 2024-07-04 DIAGNOSIS — M79.642 BILATERAL HAND PAIN: ICD-10-CM

## 2024-07-05 RX ORDER — NAPROXEN 500 MG/1
500 TABLET ORAL 2 TIMES DAILY WITH MEALS
Qty: 60 TABLET | Refills: 1 | Status: SHIPPED | OUTPATIENT
Start: 2024-07-05

## 2024-12-19 PROBLEM — M79.641 BILATERAL HAND PAIN: Status: RESOLVED | Noted: 2024-06-12 | Resolved: 2024-12-19

## 2024-12-19 PROBLEM — M79.642 BILATERAL HAND PAIN: Status: RESOLVED | Noted: 2024-06-12 | Resolved: 2024-12-19

## 2025-02-14 ENCOUNTER — HOSPITAL ENCOUNTER (OUTPATIENT)
Dept: MAMMOGRAPHY | Facility: CLINIC | Age: 55
Discharge: HOME | End: 2025-02-14
Attending: NURSE PRACTITIONER | Admitting: NURSE PRACTITIONER
Payer: COMMERCIAL

## 2025-02-14 DIAGNOSIS — Z12.31 VISIT FOR SCREENING MAMMOGRAM: ICD-10-CM

## 2025-02-14 PROCEDURE — 77063 BREAST TOMOSYNTHESIS BI: CPT

## 2025-06-04 SDOH — HEALTH STABILITY: PHYSICAL HEALTH: ON AVERAGE, HOW MANY MINUTES DO YOU ENGAGE IN EXERCISE AT THIS LEVEL?: 60 MIN

## 2025-06-04 SDOH — HEALTH STABILITY: PHYSICAL HEALTH: ON AVERAGE, HOW MANY DAYS PER WEEK DO YOU ENGAGE IN MODERATE TO STRENUOUS EXERCISE (LIKE A BRISK WALK)?: 1 DAY

## 2025-06-04 ASSESSMENT — SOCIAL DETERMINANTS OF HEALTH (SDOH): HOW OFTEN DO YOU GET TOGETHER WITH FRIENDS OR RELATIVES?: PATIENT DECLINED

## 2025-06-06 ENCOUNTER — OFFICE VISIT (OUTPATIENT)
Dept: FAMILY MEDICINE | Facility: CLINIC | Age: 55
End: 2025-06-06
Attending: NURSE PRACTITIONER
Payer: COMMERCIAL

## 2025-06-06 VITALS
SYSTOLIC BLOOD PRESSURE: 110 MMHG | HEIGHT: 60 IN | HEART RATE: 70 BPM | WEIGHT: 121.9 LBS | RESPIRATION RATE: 14 BRPM | TEMPERATURE: 97.9 F | DIASTOLIC BLOOD PRESSURE: 72 MMHG | OXYGEN SATURATION: 98 % | BODY MASS INDEX: 23.93 KG/M2

## 2025-06-06 DIAGNOSIS — M79.642 BILATERAL HAND PAIN: ICD-10-CM

## 2025-06-06 DIAGNOSIS — Z13.1 SCREENING FOR DIABETES MELLITUS: ICD-10-CM

## 2025-06-06 DIAGNOSIS — L29.9 EAR ITCHING: ICD-10-CM

## 2025-06-06 DIAGNOSIS — Z00.00 ROUTINE GENERAL MEDICAL EXAMINATION AT A HEALTH CARE FACILITY: Primary | ICD-10-CM

## 2025-06-06 DIAGNOSIS — E78.2 MIXED HYPERLIPIDEMIA: ICD-10-CM

## 2025-06-06 DIAGNOSIS — M79.641 BILATERAL HAND PAIN: ICD-10-CM

## 2025-06-06 DIAGNOSIS — M54.2 NECK PAIN: ICD-10-CM

## 2025-06-06 DIAGNOSIS — M54.12 CERVICAL RADICULOPATHY: ICD-10-CM

## 2025-06-06 LAB
CHOLEST SERPL-MCNC: 237 MG/DL
EST. AVERAGE GLUCOSE BLD GHB EST-MCNC: 114 MG/DL
FASTING STATUS PATIENT QL REPORTED: NO
HBA1C MFR BLD: 5.6 % (ref 0–5.6)
HDLC SERPL-MCNC: 56 MG/DL
LDLC SERPL CALC-MCNC: 143 MG/DL
NONHDLC SERPL-MCNC: 181 MG/DL
TRIGL SERPL-MCNC: 192 MG/DL

## 2025-06-06 PROCEDURE — 80061 LIPID PANEL: CPT | Performed by: NURSE PRACTITIONER

## 2025-06-06 PROCEDURE — 36415 COLL VENOUS BLD VENIPUNCTURE: CPT | Performed by: NURSE PRACTITIONER

## 2025-06-06 PROCEDURE — 83036 HEMOGLOBIN GLYCOSYLATED A1C: CPT | Performed by: NURSE PRACTITIONER

## 2025-06-06 RX ORDER — TRIAMCINOLONE ACETONIDE 1 MG/G
CREAM TOPICAL 2 TIMES DAILY
Qty: 15 G | Refills: 0 | Status: SHIPPED | OUTPATIENT
Start: 2025-06-06

## 2025-06-06 ASSESSMENT — PATIENT HEALTH QUESTIONNAIRE - PHQ9
10. IF YOU CHECKED OFF ANY PROBLEMS, HOW DIFFICULT HAVE THESE PROBLEMS MADE IT FOR YOU TO DO YOUR WORK, TAKE CARE OF THINGS AT HOME, OR GET ALONG WITH OTHER PEOPLE: SOMEWHAT DIFFICULT
SUM OF ALL RESPONSES TO PHQ QUESTIONS 1-9: 4
SUM OF ALL RESPONSES TO PHQ QUESTIONS 1-9: 4

## 2025-06-06 NOTE — PROGRESS NOTES
"Preventive Care Visit  [facility]  Shoshana Waters NP, Family Medicine  Jun 6, 2025      Assessment & Plan     Routine general medical examination at a health care facility    Mixed hyperlipidemia  - Lipid panel reflex to direct LDL Non-fasting    Screening for diabetes mellitus  - Hemoglobin A1c    Bilateral hand pain  Patient continues to have bilateral hand pain and \"burning\", R>L.  Symptoms seem to correlate with some radicular right neck pain.  Does not appear consistent with carpal tunnel.  There is weakness to the bilateral hands.  Recommend cervical MRI for further workup.  If unrevealing, recommend orthopedic consult.  - MR Cervical Spine w/o Contrast    Ear itching  - triamcinolone (KENALOG) 0.1 % external cream  Dispense: 15 g; Refill: 0    Neck pain  - MR Cervical Spine w/o Contrast    Cervical radiculopathy  - MR Cervical Spine w/o Contrast      The longitudinal plan of care for the diagnosis(es)/condition(s) as documented were addressed during this visit. Due to the added complexity in care, I will continue to support Baudilio in the subsequent management and with ongoing continuity of care.          Counseling  Appropriate preventive services were addressed with this patient via screening, questionnaire, or discussion as appropriate for fall prevention, nutrition, physical activity, Tobacco-use cessation, social engagement, weight loss and cognition.  Checklist reviewing preventive services available has been given to the patient.  Reviewed patient's diet, addressing concerns and/or questions.   She is at risk for lack of exercise and has been provided with information to increase physical activity for the benefit of her well-being.   She is at risk for psychosocial distress and has been provided with information to reduce risk.         Subjective   Baudilio is a 55 year old, presenting for the following:  Physical (Not Fasting Aching and tightness on hands has not improved. " )    Patient continues to have bilateral hand pain, worse on the right.  We did a rheumatological workup for this last year and she completed some hand therapy.  Still getting a lot of pain, stiffness, and burning in both hands.  It seems to be getting worse.  Denies any swelling or numbness/tingling in the hand.  The pain is worse with increased activity.  Does notice some weakness in her .  Naproxen is helpful.  Reports right neck pain that feels very sore and stiff.  It radiates down to the right arm.    Left ear canal feels very dry and itchy.  No pain or drainage.    Advance Care Planning    Discussed advance care planning with patient; however, patient declined at this time.        6/4/2025   General Health   How would you rate your overall physical health? (!) POOR   Feel stress (tense, anxious, or unable to sleep) To some extent   (!) STRESS CONCERN      6/4/2025   Nutrition   Three or more servings of calcium each day? Yes   Diet: Low fat/cholesterol   How many servings of fruit and vegetables per day? (!) 2-3   How many sweetened beverages each day? 0-1         6/4/2025   Exercise   Days per week of moderate/strenous exercise 1 day   Average minutes spent exercising at this level 60 min   (!) EXERCISE CONCERN      6/4/2025   Social Factors   Frequency of gathering with friends or relatives Patient declined   Worry food won't last until get money to buy more Patient declined   Food not last or not have enough money for food? Patient declined   Do you have housing? (Housing is defined as stable permanent housing and does not include staying outside in a car, in a tent, in an abandoned building, in an overnight shelter, or couch-surfing.) Yes   Are you worried about losing your housing? Yes   Lack of transportation? No   Unable to get utilities (heat,electricity)? No   Want help with housing or utility concern? (!) YES   (!) HOUSING CONCERN PRESENT      6/4/2025   Fall Risk   Fallen 2 or more times in the  past year? No   Trouble with walking or balance? No          6/4/2025   Dental   Dentist two times every year? (!) DECLINE       Today's PHQ-9 Score:       6/6/2025    12:54 PM   PHQ-9 SCORE   PHQ-9 Total Score MyChart 4 (Minimal depression)   PHQ-9 Total Score 4        Patient-reported         6/4/2025   Substance Use   Alcohol more than 3/day or more than 7/wk Not Applicable   Do you use any other substances recreationally? No     Social History     Tobacco Use    Smoking status: Never     Passive exposure: Never    Smokeless tobacco: Never   Vaping Use    Vaping status: Never Used   Substance Use Topics    Alcohol use: Not Currently           2/14/2025   LAST FHS-7 RESULTS   1st degree relative breast or ovarian cancer Yes   Any relative bilateral breast cancer No   Any male have breast cancer No   Any ONE woman have BOTH breast AND ovarian cancer No   Any woman with breast cancer before 50yrs No   2 or more relatives with breast AND/OR ovarian cancer No   2 or more relatives with breast AND/OR bowel cancer No                6/4/2025   STI Screening   New sexual partner(s) since last STI/HIV test? No     History of abnormal Pap smear: No - age 30- 64 PAP with HPV every 5 years recommended        Latest Ref Rng & Units 2/7/2023     9:40 AM   PAP / HPV   PAP  Negative for Intraepithelial Lesion or Malignancy (NILM)    HPV 16 DNA Negative Negative    HPV 18 DNA Negative Negative    Other HR HPV Negative Negative      ASCVD Risk   The 10-year ASCVD risk score (Kelvin JACOME, et al., 2019) is: 1.8%    Values used to calculate the score:      Age: 55 years      Sex: Female      Is Non- : No      Diabetic: No      Tobacco smoker: No      Systolic Blood Pressure: 110 mmHg      Is BP treated: No      HDL Cholesterol: 50 mg/dL      Total Cholesterol: 254 mg/dL           Reviewed and updated as needed this visit by Provider   Tobacco  Allergies  Meds  Problems  Med Hx  Surg Hx  Fam Hx                  Objective    Exam  /72   Pulse 70   Temp 97.9  F (36.6  C) (Oral)   Resp 14   Ht 1.524 m (5')   Wt 55.3 kg (121 lb 14.4 oz)   LMP  (LMP Unknown)   SpO2 98%   BMI 23.81 kg/m     Estimated body mass index is 23.81 kg/m  as calculated from the following:    Height as of this encounter: 1.524 m (5').    Weight as of this encounter: 55.3 kg (121 lb 14.4 oz).    Physical Exam  GENERAL: alert and no distress  EYES: Eyes grossly normal to inspection, PERRL and conjunctivae and sclerae normal  HENT: Left ear canal mildly flaky.  TM's normal, nose and mouth without ulcers or lesions  NECK: no adenopathy, no asymmetry, masses, or scars  RESP: lungs clear to auscultation - no rales, rhonchi or wheezes  CV: regular rate and rhythm, normal S1 S2, no S3 or S4, no murmur, click or rub, no peripheral edema  ABDOMEN: soft, nontender, no hepatosplenomegaly, no masses and bowel sounds normal  MS: no gross musculoskeletal defects noted, no edema  SKIN: no suspicious lesions or rashes  NEURO: Normal strength and tone, mentation intact and speech normal  PSYCH: mentation appears normal, affect normal/bright        Signed Electronically by: Shoshana Waters NP    Answers submitted by the patient for this visit:  Patient Health Questionnaire (Submitted on 6/6/2025)  If you checked off any problems, how difficult have these problems made it for you to do your work, take care of things at home, or get along with other people?: Somewhat difficult  PHQ9 TOTAL SCORE: 4

## 2025-06-06 NOTE — PATIENT INSTRUCTIONS
"Patient Education   Yamileth Hi Xeeb Saib Xyuas Kom Tiv Thaiv Tus Kheej  Nov yog ib co yamileth hi xeeb uas peb yeej meem muab emeka tib neeg pab lawv noj qab nyob zoo. Gilmer zaum koj pab pawg saib xyuas yuav muaj ib co yamileth hi xeeb uas tshwj xeeb emeka koj nkaus xwb. Thov nrog koj pab pawg saib xyuas sib [place] txog gilmer yam uas koj toob alfred kom tiv thaiv koj tus kheej.  Edmundo Coj Lub Neej  Es xaws xais ntau jesús 150 feeb txhua lub mccormack tiam (30 feeb txhua hnub, 5 hnub ib lub mccormack tiam).  Ua yam pab cov leeg muaj zog tuaj 2 zaug txhua lub mccormack tiam. Gilmer yam no pab koj tswj hwm koj lub cev qhov hnyav thiab tiv thaiv ntawm kab mob.  Txhob haus luam yeeb.  Pleev tshuaj tiv thaiv tshav kub kom thiaj tsis raug mob khees xaws ntawm nqaij tawv.  Txhua 2 mus emeka 7 xyoos yuav tau kuaj koj lub tsev emeka qhov radon. Radon yog ib junito jose uas tsis muaj xim tsis muaj ntxhiab uas mob tau koj cov ntsws. Kom thiaj kawm ntxiv, mus saib [email or web address] thiab ntaus ntawv \"Radon in Homes.\"  Ceev cov phom kom tsis muaj mos txwv emeka hauv thiab muab xauv ratna hauv ib qho chaw nyab xeeb xws li lub txhoj, los yog muab xauv ratna thiab muab cov yawm sij zais ratna. Muab cov mos txwv xauv emeka hauv lwm qhov chaw nrug cov phom. Kom thiaj kawm ntxiv, mus saib [email or web address] thiab ntaus ntawv \"safe gun storage.\"  Edmundo Noj Qab Huv  Noj 5 qho txiv hmab txiv ntoo thiab zaub txhua hnub.  Noj nplem nplej, txhuv xim av thiab cov fawm muaj nplej (los theej nplem dawb, txhuv dawb, thiab fawm dawb).  Ua tib zoo noj calcium thiab vitamin D ntau. Saib cov ntawv lo emeka pob khoom noj thiab siv zog noj kom txog 100% RDA (qhov ntau hauv ib hnub).  Yeej meem kuaj ntsuas  Txhua 6 lub hli mus kuaj hniav thiab muab tu.  Txhua xyoo mus saib koj pab pawg saib xyuas edmundo noj qab nyob zoo kom sib [place] txog:  Ib yam dab tsi uas hloov ntawm koj txoj edmundo noj qab nyob zoo.  Cov tshuaj uas koj pab pawg tau hais kom siv.  Edmundo saib xyuas kom tiv thaiv, edmundo npaj emeka tsev neeg, thiab yuav ua li randi tiv " thaiv ntawm kab mob uas ntev.  Edmundo txhaj tshuaj (koob tshuaj tiv thaiv)   Cov koob tshuaj HPV (txog thaum muaj 28 xyoo), yog koj yeej tsis tau txais jesús li.  Cov koob tshuaj Hepatitis B Kab Mob Siab (txog thaum muaj 61 xyoos), yog koj yeej tsis tau txais jesús li.  Koob tshuaj COVID-19: Txais koob tshuaj no thaum txog caij txais.  Koob tshuaj tiv thaiv ntawm mob khaub thuas: Txais txhua xyoo.  Koob tshuaj tiv thaiv mob daig tsaig: Txais txhua 10 xyoo.  Pneumococcal, hepatitis A, thiab RSV cov koob tshuaj: Nug koj pab pawg saib xyuas jennifer puas tsim nyog emeka koj txais cov no raws li koj qhov pheej hmoo.  Koob tshuaj tiv thaiv ntawm kab mob sawv hlwv (emeka cov muaj 52 xyoo rov abhishek).  Edmundo kuaj ntsuas emeka edmundo noj qab nyob zoo  Kuaj mob ntshav qab zib:  Pib thaum muaj 37 xyoos, Mus kuaj mob ntshav qab zib txhua 5 xyoos los yog ntau zog.  Yog koj tseem tsis tau txog 37 xyoos, nug koj pab pawg saib xyuas jennifer puas tsim nyog emeka koj kuaj mob ntshav qab zib.  Kuaj cholesterol: Thaum muaj 41 xyoo, pib kuaj cholesterol txhua 7 xyoos, los yog ntau jesús ntawd yog kws chloe mob qhia.  Kuaj txha qhov tuab (DEXA): Thaum txog 52 xyoo lawd, nug koj pab pawg saib xyuas jennifer puas tsim nyog emeka koj kuaj txha jennifer puas ruaj.  Kab Mob Siab Hepatitis C: Kuaj ib zaug hauv koj lub neej.  Kuaj Txoj Hlab Ntshav Hauv Plab: Nrog koj tus kws chloe mob layton txog edmundo kuaj ntsuas no yog koj:  Tau haus luam yeeb ib zaug li; thiab  Yog txiv neej; thiab  Nruab hnub nyoog 67 thiab 77.  Mob Alfred Cees (kis mob dhau ntawm edmundo sib deev)  Ua ntej muaj 26 xyoos: Nug koj pab pawg saib xyuas jennifer puas tsim nyog kuaj emeka mob alfred cees.  Sukhdeep qab muaj 26 xyoos: Mus kuaj emeka mob alfred cees yog koj muaj edmundo pheej hmoo. Koj muaj edmundo pheej hmoo emeka mob alfred cees (suav nrog HIV) yog:  Koj sib deev nrog ntau jesús ib tug neeg.  Koj tsis siv cov hnab looj qau thaum sib deev.  Koj los yog koj tus khub deev kuaj pom tias muaj mob alfred cees lawm.  Yog koj muaj edmundo pheej hmoo emeka mob alfred cees  HIV, nug txog cov tshuaj PrEP kom tiv thaiv ntawm HIV.  Mus kuaj emeka mob alfred cees HIV yam ntau jesús ib zaug hauv koj lub neej, txawm yog koj muaj edmundo pheej hmoo emeka mob alfred cees HIV los tsis muaj los xij.  Kuaj emeka mob khees xaws  Kuaj lub ncauj tsev me nyuam emeka mob khees xaws: Yog koj muaj ib lub ncauj tsev me nyuam, simon yuav tau ib sij kuaj lub ncauj tsev me nyuam seb puas muaj mob khees xaws pib thaum muaj 23 xyoos. Neeg feem coob uas ib sij kuaj lub ncauj tsev me nyuam thiab tsis pom dab tsi txawv lawv tsum tau mauri qab muaj 67 xyoos. Nrog koj tus kws chloe mob sib lay[place] txog qhov no.  Kuaj lub mis emeka mob khees xaws (mammogram): Yog koj tau muaj mis jesús li, yuav tau ib sij kuaj lub mis pib thaum muaj 40 xyoo. Edmundo kuaj no yog kom saib seb puas muaj mob khees xaws hauv lub mis.  Kuaj Txoj Hnyuv Emeka Mob Khees Xaws: Yeej tseem ceeb pib kuaj txoj hnyuv emeka mob khees xaws pib thaum muaj 47 xyoos.  Txhua 12 xyoo yuav tau kuaj txoj hnyuv (los yog ntau zog yog koj muaj edmundo pheej hmoo) Los sis, nug koj tus kws chloe mob txog edmundo kuaj thooj quav FIT txhua xyoo los yog Cologuard txhua 5 xyoos.  Kom thiaj kawm ntxiv txog gilmer junito kuaj no, mus saib: [email or web address].  Yog xav tau edmundo pab txog qhov no, mus saib: [email or web address].  Kuaj lub yuridia kua phev (prostate) emeka mob khees xaws: Yog koj muaj ib lub yuridia kua phev (prostate) thiab nruab hnub nyoog 57 mus emeka 71, nug koj tus kws chloe mob jennifer puas tsim nyog emeka koj kuaj lub yuridia kua phev.  Kuaj ntsws emeka mob khees xaws: Yog koj haus luam yeeb lewis sim no los yog tau haus yav tas los uas muaj hnub nyoog nruab 50 xyoos mus emeka 82 xyoo, nug koj pab pawg saib xyuas jennifer puas tsim nyog emeka koj yeej meem kuaj lub ntsws emeka mob khees xaws.    Emeka cov junito phiaj edmundo siv ua ntaub ntawv qhia nkaus xwb. Yuav tsis pauv edmundo qhia los ntawm koj qhov chaw chloe mob. Copyright (jennifer esteves)   2023 [facility].   Txhua txoj linn raug tswj tseg lawm. Tshaj xyuas los ntawm [facility]. [employer] [id number] - REV 04/24.  Learning About Stress  What is stress?     Stress is your body's response to a hard situation. Your body can have a physical, emotional, or mental response. Stress is a fact of life for most people, and it affects everyone differently. What causes stress for you may not be stressful for someone else.  A lot of things can cause stress. You may feel stress when you go on a job interview, take a test, or run a race. This kind of short-term stress is normal and even useful. It can help you if you need to work hard or react quickly. For example, stress can help you finish an important job on time.  Long-term stress is caused by ongoing stressful situations or events. Examples of long-term stress include long-term health problems, ongoing problems at work, or conflicts in your family. Long-term stress can harm your health.  How does stress affect your health?  When you are stressed, your body responds as though you are in danger. It makes hormones that speed up your heart, make you breathe faster, and give you a burst of energy. This is called the fight-or-flight stress response. If the stress is over quickly, your body goes back to normal and no harm is done.  But if stress happens too often or lasts too long, it can have bad effects. Long-term stress can make you more likely to get sick, and it can make symptoms of some diseases worse. If you tense up when you are stressed, you may develop neck, shoulder, or low back pain. Stress is linked to high blood pressure and heart disease.  Stress also harms your emotional health. It can make you samuels, tense, or depressed. Your relationships may suffer, and you may not do well at work or school.  What can you do to manage stress?  You can try these things to help manage stress:   Do something active. Exercise or activity can help reduce stress. Walking is a great way to get started. Even everyday activities such as  housecleaning or yard work can help.  Try yoga or sergio chi. These techniques combine exercise and meditation. You may need some training at first to learn them.  Do something you enjoy. For example, listen to music or go to a movie. Practice your hobby or do volunteer work.  Meditate. This can help you relax, because you are not worrying about what happened before or what may happen in the future.  Do guided imagery. Imagine yourself in any setting that helps you feel calm. You can use online videos, books, or a teacher to guide you.  Do breathing exercises. For example:  From a standing position, bend forward from the waist with your knees slightly bent. Let your arms dangle close to the floor.  Breathe in slowly and deeply as you return to a standing position. Roll up slowly and lift your head last.  Hold your breath for just a few seconds in the standing position.  Breathe out slowly and bend forward from the waist.  Let your feelings out. Talk, laugh, cry, and express anger when you need to. Talking with supportive friends or family, a counselor, or a quinn leader about your feelings is a healthy way to relieve stress. Avoid discussing your feelings with people who make you feel worse.  Write. It may help to write about things that are bothering you. This helps you find out how much stress you feel and what is causing it. When you know this, you can find better ways to cope.  What can you do to prevent stress?  You might try some of these things to help prevent stress:  Manage your time. This helps you find time to do the things you want and need to do.  Get enough sleep. Your body recovers from the stresses of the day while you are sleeping.  Get support. Your family, friends, and community can make a difference in how you experience stress.  Limit your news feed. Avoid or limit time on social media or news that may make you feel stressed.  Do something active. Exercise or activity can help reduce stress.  "Walking is a great way to get started.  Where can you learn more?  Go to https://www.[employer].net/patiented  Enter N032 in the search box to learn more about \"Learning About Stress.\"  Current as of: October 24, 2024  Content Version: 14.4 2024-2025 [employer].   Care instructions adapted under license by your healthcare professional. If you have questions about a medical condition or this instruction, always ask your healthcare professional. [employer] disclaims any warranty or liability for your use of this information.       "

## 2025-06-28 ENCOUNTER — HOSPITAL ENCOUNTER (OUTPATIENT)
Dept: MRI IMAGING | Facility: CLINIC | Age: 55
Discharge: HOME | End: 2025-06-28
Attending: NURSE PRACTITIONER | Admitting: NURSE PRACTITIONER
Payer: COMMERCIAL

## 2025-06-28 DIAGNOSIS — M54.2 NECK PAIN: ICD-10-CM

## 2025-06-28 DIAGNOSIS — M79.641 BILATERAL HAND PAIN: ICD-10-CM

## 2025-06-28 DIAGNOSIS — M79.642 BILATERAL HAND PAIN: ICD-10-CM

## 2025-06-28 DIAGNOSIS — M54.12 CERVICAL RADICULOPATHY: ICD-10-CM

## 2025-06-28 PROCEDURE — 72141 MRI NECK SPINE W/O DYE: CPT
